# Patient Record
Sex: FEMALE | Race: OTHER | URBAN - METROPOLITAN AREA
[De-identification: names, ages, dates, MRNs, and addresses within clinical notes are randomized per-mention and may not be internally consistent; named-entity substitution may affect disease eponyms.]

---

## 2017-04-15 ENCOUNTER — OUTPATIENT (OUTPATIENT)
Dept: OUTPATIENT SERVICES | Facility: HOSPITAL | Age: 57
LOS: 1 days | Discharge: HOME | End: 2017-04-15

## 2017-06-27 DIAGNOSIS — M79.1 MYALGIA: ICD-10-CM

## 2024-03-22 ENCOUNTER — APPOINTMENT (OUTPATIENT)
Dept: OTOLARYNGOLOGY | Facility: CLINIC | Age: 64
End: 2024-03-22
Payer: MEDICARE

## 2024-03-22 DIAGNOSIS — L29.9 PRURITUS, UNSPECIFIED: ICD-10-CM

## 2024-03-22 DIAGNOSIS — H61.23 IMPACTED CERUMEN, BILATERAL: ICD-10-CM

## 2024-03-22 PROBLEM — Z00.00 ENCOUNTER FOR PREVENTIVE HEALTH EXAMINATION: Status: ACTIVE | Noted: 2024-03-22

## 2024-03-22 PROCEDURE — G0268 REMOVAL OF IMPACTED WAX MD: CPT

## 2024-03-22 PROCEDURE — 99204 OFFICE O/P NEW MOD 45 MIN: CPT | Mod: 25

## 2024-03-22 NOTE — ASSESSMENT
[FreeTextEntry1] : 63F here for initial visit. Over the past few months she c/o itchiness, dryness and fullness in the left ear. There is no otorrhea, otalgia or vertigo. She had seen an ENT who gave peroxide drops which only caused more discomfort. Audiogram from today shows asymmetric low frequency left SNHL, as above, with symmetric HF SNHL. On exam, cerumen was removed from both EACs. Asymmetric low frequency SNHL -> will send for MRI and RTO thereafter for review. Can use ciprodex which she has from her PCP to help w the left itching/dryness.

## 2024-03-22 NOTE — HISTORY OF PRESENT ILLNESS
[de-identified] : 63F here for initial visit.  Over the past few months she c/o itchiness, dryness and fullness in the left ear. There is no otorrhea, otalgia or vertigo. She had seen an ENT who gave peroxide drops which only caused more discomfort.  Audiogram from today: R: normal hearing sloping to mild to severe HF SNHL. SRT 20, 100%, type As L: mild SNHL rising to normal sloping to moderate to severe SNHL. SRT 35, 100%, type As  ROS otherwise unremarkable

## 2024-03-22 NOTE — PHYSICAL EXAM
[Midline] : trachea located in midline position [Normal] : no rashes [de-identified] : cerumen removed from EACs (right>>left). TM intact and mobile, ME clear

## 2024-05-02 ENCOUNTER — APPOINTMENT (OUTPATIENT)
Dept: OTOLARYNGOLOGY | Facility: CLINIC | Age: 64
End: 2024-05-02
Payer: MEDICARE

## 2024-05-02 VITALS
HEIGHT: 63 IN | DIASTOLIC BLOOD PRESSURE: 95 MMHG | TEMPERATURE: 96.7 F | SYSTOLIC BLOOD PRESSURE: 159 MMHG | WEIGHT: 155 LBS | BODY MASS INDEX: 27.46 KG/M2 | HEART RATE: 73 BPM

## 2024-05-02 DIAGNOSIS — H93.8X2 OTHER SPECIFIED DISORDERS OF LEFT EAR: ICD-10-CM

## 2024-05-02 DIAGNOSIS — R09.A2 FOREIGN BODY SENSATION, THROAT: ICD-10-CM

## 2024-05-02 PROCEDURE — 99213 OFFICE O/P EST LOW 20 MIN: CPT

## 2024-05-02 RX ORDER — ESOMEPRAZOLE MAGNESIUM 40 MG/1
40 CAPSULE, DELAYED RELEASE ORAL
Qty: 30 | Refills: 10 | Status: ACTIVE | COMMUNITY
Start: 2024-05-02 | End: 1900-01-01

## 2024-05-02 RX ORDER — FAMOTIDINE 40 MG/1
40 TABLET, FILM COATED ORAL
Qty: 90 | Refills: 0 | Status: ACTIVE | COMMUNITY
Start: 2024-05-02 | End: 1900-01-01

## 2024-05-02 NOTE — PHYSICAL EXAM
[de-identified] : Au: EAC clear, TM intact and mobile, ME clear [Midline] : trachea located in midline position [de-identified] : posterior opx clear [Normal] : no rashes

## 2024-05-02 NOTE — ASSESSMENT
[FreeTextEntry1] : 63F here in followup to review MRI as audiogram from prior visit showed a mild asymmetric low frequency left SNHL, as above, with symmetric HF SNHL. MRI shows normal IACs. She also c/o constant feeling there is mucus stuck in her throat with postnasal drip and frequent throat clearing. She has reflux and has been on low dose PPI briefly 6 months ago without improvement. There is no h/o sinusitis. Head, neck and ear exam are unremarkable. -Asymmetric low frequency SNHL -> normal MRI. Can use ciprodex which she has from her PCP to help w the left itching/dryness.  -[facility] - would favor silent reflux/LPR -> to try PPI/H2B trial with diet/lifestyle as next step.

## 2024-05-02 NOTE — HISTORY OF PRESENT ILLNESS
[de-identified] : 63F here for initial visit.  Over the past few months she c/o itchiness, dryness and fullness in the left ear. There is no otorrhea, otalgia or vertigo. She had seen an ENT who gave peroxide drops which only caused more discomfort. She also c/o constant feeling there is mucus stuck in her throat with postnasal drip and frequent throat clearing. She has reflux and has been on low dose PPI briefly 6 months ago without improvement. There is no h/o sinusitis.  Audiogram from 3/22/24: R: normal hearing sloping to mild to severe HF SNHL. SRT 20, 100%, type As L: mild SNHL rising to normal sloping to moderate to severe SNHL. SRT 35, 100%, type As  MRI IAC 4/17/24 is unremarkable as below.  ROS otherwise unremarkable

## 2024-05-02 NOTE — DATA REVIEWED
[de-identified] : see hpi [de-identified] : MRI IAC 4/2024: FINDINGS:    The ventricles and sulci are of normal caliber for the patient's age without evidence of hydrocephalus.  The diffusion-weighted images reveal no evidence of acute infarct. There is no evidence of hemorrhage. There is no mass.  There are multiple foci of T2/FLAIR hyperintensity in the periventricular, deep, and subcortical white matter of the supratentorial brain. No infratentorial signal abnormality is seen.  The midline structures have a normal appearance. There is mild mucosal thickening of the bilateral ethmoid air cells. The intracranial flow voids are normal at the skull base.  High-resolution imaging of the cerebellopontine angles and internal auditory canals demonstrates the nerve roots to be well visualized. There is no evidence of mass. There is normal fluid signal within the inner ear structures.  IMPRESSION:    1.  Moderate white matter disease. This likely represents moderate microvascular ischemic disease. Additional less likely differential considerations include sequela of migraine, sequela of prior infection or inflammation, demyelinating disease or vasculitis. Clinical correlation is recommended. 2.  Unremarkable MR evaluation of the internal auditory canals.

## 2024-09-12 ENCOUNTER — APPOINTMENT (OUTPATIENT)
Dept: OTOLARYNGOLOGY | Facility: CLINIC | Age: 64
End: 2024-09-12
Payer: MEDICARE

## 2024-09-12 DIAGNOSIS — L29.9 PRURITUS, UNSPECIFIED: ICD-10-CM

## 2024-09-12 PROCEDURE — 99214 OFFICE O/P EST MOD 30 MIN: CPT

## 2024-09-12 RX ORDER — PREDNISOLONE ACETATE 10 MG/ML
1 SUSPENSION/ DROPS OPHTHALMIC
Qty: 1 | Refills: 2 | Status: ACTIVE | COMMUNITY
Start: 2024-09-12 | End: 1900-01-01

## 2024-09-12 NOTE — DATA REVIEWED
[de-identified] : see hpi [de-identified] : MRI IAC 4/2024: FINDINGS:    The ventricles and sulci are of normal caliber for the patient's age without evidence of hydrocephalus.  The diffusion-weighted images reveal no evidence of acute infarct. There is no evidence of hemorrhage. There is no mass.  There are multiple foci of T2/FLAIR hyperintensity in the periventricular, deep, and subcortical white matter of the supratentorial brain. No infratentorial signal abnormality is seen.  The midline structures have a normal appearance. There is mild mucosal thickening of the bilateral ethmoid air cells. The intracranial flow voids are normal at the skull base.  High-resolution imaging of the cerebellopontine angles and internal auditory canals demonstrates the nerve roots to be well visualized. There is no evidence of mass. There is normal fluid signal within the inner ear structures.  IMPRESSION:    1.  Moderate white matter disease. This likely represents moderate microvascular ischemic disease. Additional less likely differential considerations include sequela of migraine, sequela of prior infection or inflammation, demyelinating disease or vasculitis. Clinical correlation is recommended. 2.  Unremarkable MR evaluation of the internal auditory canals.

## 2024-09-12 NOTE — ASSESSMENT
[FreeTextEntry1] : 63F here in followup. She is doing well since last visit. She still c/o left ear itching and dryness. There is no otorrhea, otalgia or vertigo. She has reflux and has been on low dose PPI which has helped. Head, neck and ear exam today remains unremarkable. -Known asymmetric low frequency SNHL -> normal MRI. -Ear itching - will trial predforte and see if helps. Keep ear dry -Silent reflux/LPR -> cont w diet/lifestyle as next step.

## 2024-09-12 NOTE — PHYSICAL EXAM
[de-identified] : Au: EAC clear, TM intact and mobile, ME clear [Midline] : trachea located in midline position [de-identified] : posterior opx clear [Normal] : no rashes

## 2024-09-12 NOTE — HISTORY OF PRESENT ILLNESS
[de-identified] : 63F here in followup.  She is doing well since last visit. She still c/o left ear itching and dryness. There is no otorrhea, otalgia or vertigo. She has reflux and has been on low dose PPI which has helped.  Audiogram from 3/22/24: R: normal hearing sloping to mild to severe HF SNHL. SRT 20, 100%, type As L: mild SNHL rising to normal sloping to moderate to severe SNHL. SRT 35, 100%, type As  MRI IAC 4/17/24 is unremarkable as below.  ROS otherwise unremarkable

## 2024-09-15 NOTE — REASON FOR VISIT
[Subsequent Evaluation] : a subsequent evaluation for [FreeTextEntry2] : ear 4 = No assist / stand by assistance

## 2025-03-11 ENCOUNTER — APPOINTMENT (OUTPATIENT)
Dept: OTOLARYNGOLOGY | Facility: CLINIC | Age: 65
End: 2025-03-11
Payer: MEDICARE

## 2025-03-11 DIAGNOSIS — L29.9 PRURITUS, UNSPECIFIED: ICD-10-CM

## 2025-03-11 PROCEDURE — 99214 OFFICE O/P EST MOD 30 MIN: CPT

## 2025-03-11 RX ORDER — MOMETASONE FUROATE 1 MG/G
0.1 OINTMENT TOPICAL TWICE DAILY
Qty: 1 | Refills: 0 | Status: ACTIVE | COMMUNITY
Start: 2025-03-11 | End: 1900-01-01

## 2025-09-09 ENCOUNTER — APPOINTMENT (OUTPATIENT)
Dept: OTOLARYNGOLOGY | Facility: CLINIC | Age: 65
End: 2025-09-09